# Patient Record
Sex: MALE | Race: WHITE | NOT HISPANIC OR LATINO | Employment: UNEMPLOYED | URBAN - METROPOLITAN AREA
[De-identification: names, ages, dates, MRNs, and addresses within clinical notes are randomized per-mention and may not be internally consistent; named-entity substitution may affect disease eponyms.]

---
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
## 2024-08-14 ENCOUNTER — HOSPITAL ENCOUNTER (EMERGENCY)
Age: 19
Discharge: HOME | End: 2024-08-14
Payer: MEDICAID

## 2024-08-14 VITALS
WEIGHT: 140 LBS | HEIGHT: 69 IN | OXYGEN SATURATION: 98 % | SYSTOLIC BLOOD PRESSURE: 127 MMHG | DIASTOLIC BLOOD PRESSURE: 73 MMHG | TEMPERATURE: 99 F | BODY MASS INDEX: 20.73 KG/M2 | HEART RATE: 73 BPM | RESPIRATION RATE: 18 BRPM

## 2024-08-14 DIAGNOSIS — L02.91 ABSCESS: Primary | ICD-10-CM

## 2024-08-14 PROCEDURE — 99283 EMERGENCY DEPT VISIT LOW MDM: CPT

## 2024-08-14 RX ORDER — SULFAMETHOXAZOLE AND TRIMETHOPRIM 800; 160 MG/1; MG/1
1 TABLET ORAL 2 TIMES DAILY
Qty: 20 TABLET | Refills: 0 | Status: SHIPPED | OUTPATIENT
Start: 2024-08-14 | End: 2024-08-24

## 2024-08-14 RX ORDER — CEPHALEXIN 500 MG/1
500 CAPSULE ORAL 4 TIMES DAILY
Qty: 28 CAPSULE | Refills: 0 | Status: SHIPPED | OUTPATIENT
Start: 2024-08-14 | End: 2024-08-21

## 2024-08-14 ASSESSMENT — PAIN DESCRIPTION - FREQUENCY: FREQUENCY: CONTINUOUS

## 2024-08-14 ASSESSMENT — PAIN - FUNCTIONAL ASSESSMENT: PAIN_FUNCTIONAL_ASSESSMENT: 0-10

## 2024-08-14 ASSESSMENT — PAIN DESCRIPTION - ORIENTATION: ORIENTATION: RIGHT

## 2024-08-14 ASSESSMENT — PAIN DESCRIPTION - PAIN TYPE: TYPE: ACUTE PAIN

## 2024-08-14 ASSESSMENT — PAIN DESCRIPTION - LOCATION: LOCATION: LEG

## 2024-08-14 ASSESSMENT — PAIN SCALES - GENERAL: PAINLEVEL_OUTOF10: 6

## 2024-08-14 NOTE — ED NOTES
Pt to ED with c/o abscess to R upper thigh. PT states noticing spot x3 days ago, states no improvement and wants to r/o MRSA

## 2024-08-17 ENCOUNTER — HOSPITAL ENCOUNTER (EMERGENCY)
Age: 19
Discharge: HOME | End: 2024-08-17
Payer: MEDICAID

## 2024-08-17 VITALS
SYSTOLIC BLOOD PRESSURE: 118 MMHG | WEIGHT: 140 LBS | OXYGEN SATURATION: 98 % | DIASTOLIC BLOOD PRESSURE: 81 MMHG | TEMPERATURE: 98.2 F | BODY MASS INDEX: 20.73 KG/M2 | HEART RATE: 75 BPM | HEIGHT: 69 IN | RESPIRATION RATE: 18 BRPM

## 2024-08-17 DIAGNOSIS — L02.91 ABSCESS: Primary | ICD-10-CM

## 2024-08-17 PROCEDURE — 87070 CULTURE OTHR SPECIMN AEROBIC: CPT

## 2024-08-17 PROCEDURE — 87075 CULTR BACTERIA EXCEPT BLOOD: CPT

## 2024-08-17 PROCEDURE — 99283 EMERGENCY DEPT VISIT LOW MDM: CPT

## 2024-08-17 PROCEDURE — 87147 CULTURE TYPE IMMUNOLOGIC: CPT

## 2024-08-17 PROCEDURE — 87205 SMEAR GRAM STAIN: CPT

## 2024-08-17 ASSESSMENT — PAIN - FUNCTIONAL ASSESSMENT: PAIN_FUNCTIONAL_ASSESSMENT: 0-10

## 2024-08-17 ASSESSMENT — PAIN DESCRIPTION - LOCATION: LOCATION: LEG

## 2024-08-17 ASSESSMENT — PAIN DESCRIPTION - DESCRIPTORS: DESCRIPTORS: STABBING

## 2024-08-17 ASSESSMENT — PAIN DESCRIPTION - FREQUENCY: FREQUENCY: CONTINUOUS

## 2024-08-17 ASSESSMENT — PAIN SCALES - GENERAL: PAINLEVEL_OUTOF10: 6

## 2024-08-17 ASSESSMENT — PAIN DESCRIPTION - ORIENTATION: ORIENTATION: RIGHT

## 2024-08-17 NOTE — DISCHARGE INSTRUCTIONS
Continue taking medication as prescribed.  Taking the antibiotics with food may improve the nausea.     Please return to the emergency department for uncontrolled fever, uncontrolled pain, increased redness or swelling from the area, or any other concerns.

## 2024-08-17 NOTE — ED NOTES
Pt presents to ED c/o abscess on right upper leg. Pt states being seen a couple days ago and getting prescribed antibiotics. Pt states placing warm compress on abscess and drainage that was white and yellow came out. Pt states that abscess is now back.

## 2024-08-17 NOTE — ED PROVIDER NOTES
[facility] EMERGENCY DEPT      EMERGENCY MEDICINE     Pt Name: Hussein Bond  MRN: [id number]  Birthdate 2005  Date of evaluation: 8/17/2024  Provider: Meka Causey PA-C    CHIEF COMPLAINT       Chief Complaint   Patient presents with    Abscess     Right leg     HISTORY OF PRESENT ILLNESS   Hussein Bond is a pleasant 19 y.o. male who presents to the emergency department from from home, by private vehicle for evaluation of abscess.  Patient was seen here on August 14th for an abscess on his right lower extremity, for which she was given Bactrim, Keflex, and mupirocin.  At that time he was also instructed to do warm compresses.  Patient presented today because abscess started to drain after warm compresses.  Patient denies fever, emesis, diarrhea, diaphoresis, dyspnea, and syncope.    PASTMEDICAL HISTORY   No past medical history on file.    There is no problem list on file for this patient.    SURGICAL HISTORY     No past surgical history on file.    CURRENT MEDICATIONS       Discharge Medication List as of 8/17/2024 12:12 PM        CONTINUE these medications which have NOT CHANGED    Details   cephALEXin (KEFLEX) 500 MG capsule Take 1 capsule by mouth 4 times daily for 7 days, Disp-28 capsule, R-0Normal      sulfamethoxazole-trimethoprim (BACTRIM DS) 800-160 MG per tablet Take 1 tablet by mouth 2 times daily for 10 days, Disp-20 tablet, R-0Normal      mupirocin (BACTROBAN) 2 % ointment Apply topically 3 times daily., Disp-15 g, R-0, Normal             ALLERGIES     is allergic to motrin [ibuprofen].    FAMILY HISTORY     has no family status information on file.        SOCIAL HISTORY          PHYSICAL EXAM       ED Triage Vitals [08/17/24 1050]   BP Systolic BP Percentile Diastolic BP Percentile Temp Temp Source Pulse Respirations SpO2   118/81 -- -- 98.2 °F (36.8 °C) Oral 75 18 98 %      Height Weight - Scale         1.753 m (5' 9\") 63.5 kg (140 lb)             Additional Vital Signs:  Vitals:    08/17/24

## 2024-08-18 LAB
BACTERIA SPEC AEROBE CULT: NORMAL
GRAM STN SPEC: NORMAL

## 2024-08-19 NOTE — ED PROVIDER NOTES
MIPS  N/A                    Medical Decision Making  The patient was seen in the ER for a right upper inner leg abscess.  Not fluctuant.  Doesn't need drained.  Reviewed findings with patient.  Will dc home with wound care instructions and oral abx as well as follow up instructions.       Vitals Reviewed:    Vitals:    08/14/24 1818 08/14/24 1934   BP: 128/81 127/73   Pulse: (!) 107 73   Resp: 18 18   Temp: 99 °F (37.2 °C)    TempSrc: Oral    SpO2: 98% 98%   Weight: 63.5 kg (140 lb)    Height: 1.753 m (5' 9\")        The patient was seen and examined. Appropriate diagnostic testing was performed and results reviewed with the patient.         The results of pertinent diagnostic studies and exam findings were discussed. The patient’s provisional diagnosis and plan of care were discussed with the patient and present family who expressed understanding and agreement with the POC. Any medications were reviewed and indications and risks of medications were discussed with the patient /family present. Strict verbal and written return precautions, instructions and appropriate follow-up provided to  the patient.   Patient was DISCHARGED from the hospital. Based on the reassuring ED workup and patient's stable vital signs, I feel the patient may be safely discharged home. At this point in time, I believe the patient has the mental capacity to make medical decisions.      No notes of EC Admission Criteria type on file.        Patient was seen independently by myself. The patient's final impression and disposition and plan was determined by myself.     Strict return precautions and follow up instructions were discussed with the patient prior to discharge, with which the patient agrees.    Physical assessment findings, diagnostic testing(s) if applicable, and vital signs reviewed with patient/patient representative.  Questions answered.   Medications asdirected, including OTC medications for supportive care.   Education provided

## 2024-08-22 LAB
BACTERIA SPEC AEROBE CULT: NORMAL
BACTERIA SPEC ANAEROBE CULT: NORMAL
GRAM STN SPEC: NORMAL